# Patient Record
Sex: MALE | Race: WHITE | NOT HISPANIC OR LATINO | Employment: OTHER | URBAN - METROPOLITAN AREA
[De-identification: names, ages, dates, MRNs, and addresses within clinical notes are randomized per-mention and may not be internally consistent; named-entity substitution may affect disease eponyms.]

---

## 2020-11-21 ENCOUNTER — HOSPITAL ENCOUNTER (EMERGENCY)
Facility: HOSPITAL | Age: 35
Discharge: HOME | End: 2020-11-21
Attending: INTERNAL MEDICINE
Payer: MEDICAID

## 2020-11-21 VITALS
TEMPERATURE: 98 F | OXYGEN SATURATION: 98 % | WEIGHT: 265 LBS | DIASTOLIC BLOOD PRESSURE: 98 MMHG | HEIGHT: 75 IN | SYSTOLIC BLOOD PRESSURE: 137 MMHG | BODY MASS INDEX: 32.95 KG/M2 | RESPIRATION RATE: 20 BRPM | HEART RATE: 98 BPM

## 2020-11-21 DIAGNOSIS — R52 PAIN: ICD-10-CM

## 2020-11-21 DIAGNOSIS — M25.521 RIGHT ELBOW PAIN: Primary | ICD-10-CM

## 2020-11-21 PROCEDURE — 73080 X-RAY EXAM OF ELBOW: CPT | Mod: TC,FY,RT

## 2020-11-21 PROCEDURE — 99284 EMERGENCY DEPT VISIT MOD MDM: CPT | Mod: 25

## 2020-11-21 PROCEDURE — 63600175 PHARM REV CODE 636 W HCPCS: Performed by: INTERNAL MEDICINE

## 2020-11-21 PROCEDURE — 73080 X-RAY EXAM OF ELBOW: CPT | Mod: 26,RT,, | Performed by: RADIOLOGY

## 2020-11-21 PROCEDURE — 96372 THER/PROPH/DIAG INJ SC/IM: CPT

## 2020-11-21 PROCEDURE — 73080 XR ELBOW COMPLETE 3 VIEW RIGHT: ICD-10-PCS | Mod: 26,RT,, | Performed by: RADIOLOGY

## 2020-11-21 RX ORDER — ORPHENADRINE CITRATE 100 MG/1
100 TABLET, EXTENDED RELEASE ORAL 2 TIMES DAILY PRN
Qty: 10 TABLET | Refills: 0 | Status: SHIPPED | OUTPATIENT
Start: 2020-11-21 | End: 2020-12-01

## 2020-11-21 RX ORDER — KETOROLAC TROMETHAMINE 30 MG/ML
60 INJECTION, SOLUTION INTRAMUSCULAR; INTRAVENOUS
Status: COMPLETED | OUTPATIENT
Start: 2020-11-21 | End: 2020-11-21

## 2020-11-21 RX ADMIN — KETOROLAC TROMETHAMINE 60 MG: 30 INJECTION, SOLUTION INTRAMUSCULAR at 09:11
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
## 2020-11-30 ENCOUNTER — TELEPHONE (OUTPATIENT)
Dept: ORTHOPEDICS | Facility: CLINIC | Age: 35
End: 2020-11-30

## 2020-11-30 NOTE — TELEPHONE ENCOUNTER
Received referral for right elbow pain. Patient has [place] Medicaid. Advised patient to call his insurance to see who he can see. Verbalized understanding.

## 2020-12-01 ENCOUNTER — OFFICE VISIT (OUTPATIENT)
Dept: FAMILY MEDICINE | Facility: CLINIC | Age: 35
End: 2020-12-01
Payer: MEDICAID

## 2020-12-01 VITALS
HEART RATE: 83 BPM | WEIGHT: 257 LBS | HEIGHT: 75 IN | OXYGEN SATURATION: 97 % | SYSTOLIC BLOOD PRESSURE: 130 MMHG | TEMPERATURE: 98 F | BODY MASS INDEX: 31.95 KG/M2 | DIASTOLIC BLOOD PRESSURE: 82 MMHG

## 2020-12-01 DIAGNOSIS — M77.01 MEDIAL EPICONDYLITIS OF RIGHT ELBOW: ICD-10-CM

## 2020-12-01 DIAGNOSIS — M79.605 PAIN IN LEFT LEG: ICD-10-CM

## 2020-12-01 DIAGNOSIS — R41.840 DECREASED ATTENTION SPAN: Primary | ICD-10-CM

## 2020-12-01 DIAGNOSIS — F41.1 GENERALIZED ANXIETY DISORDER: ICD-10-CM

## 2020-12-01 DIAGNOSIS — M77.11 LATERAL EPICONDYLITIS OF RIGHT ELBOW: ICD-10-CM

## 2020-12-01 DIAGNOSIS — Z00.00 PREVENTATIVE HEALTH CARE: ICD-10-CM

## 2020-12-01 DIAGNOSIS — R20.2 PARESTHESIA OF LEFT LEG: ICD-10-CM

## 2020-12-01 DIAGNOSIS — Z11.59 SCREENING FOR VIRAL DISEASE: ICD-10-CM

## 2020-12-01 PROCEDURE — 99204 OFFICE O/P NEW MOD 45 MIN: CPT | Mod: S$PBB,,, | Performed by: NURSE PRACTITIONER

## 2020-12-01 PROCEDURE — 99205 OFFICE O/P NEW HI 60 MIN: CPT | Performed by: NURSE PRACTITIONER

## 2020-12-01 PROCEDURE — 99204 PR OFFICE/OUTPT VISIT, NEW, LEVL IV, 45-59 MIN: ICD-10-PCS | Mod: S$PBB,,, | Performed by: NURSE PRACTITIONER

## 2020-12-01 RX ORDER — BUSPIRONE HYDROCHLORIDE 7.5 MG/1
7.5 TABLET ORAL 2 TIMES DAILY
Qty: 60 TABLET | Refills: 3 | Status: SHIPPED | OUTPATIENT
Start: 2020-12-01

## 2020-12-01 RX ORDER — IBUPROFEN 800 MG/1
800 TABLET ORAL
Qty: 60 TABLET | Refills: 0 | Status: ON HOLD | OUTPATIENT
Start: 2020-12-01 | End: 2021-02-12 | Stop reason: HOSPADM

## 2020-12-01 RX ORDER — GABAPENTIN 100 MG/1
CAPSULE ORAL
Qty: 60 CAPSULE | Refills: 2 | Status: SHIPPED | OUTPATIENT
Start: 2020-12-01
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
## 2020-12-01 NOTE — PATIENT INSTRUCTIONS
Anxiety Reaction  Anxiety is the feeling we all get when we think something bad might happen. It is a normal response to stress and usually causes only a mild reaction. When anxiety becomes more severe, it can interfere with daily life. In some cases, you may not even be aware of what it is youre anxious about. There may also be a genetic link or it may be a learned behavior in the home.  Both psychological and physical triggers cause stress reaction. It's often a response to fear or emotional stress, real or imagined. This stress may come from home, family, work, or social relationships.  During an anxiety reaction, you may feel:  · Helpless  · Nervous  · Depressed  · Irritable  Your body may show signs of anxiety in many ways. You may experience:  · Dry mouth  · Shakiness  · Dizziness  · Weakness  · Trouble breathing  · Breathing fast (hyperventilating)  · Chest pressure  · Sweating  · Headache  · Nausea  · Diarrhea  · Tiredness  · Inability to sleep  · Sexual problems  Home care  · Try to locate the sources of stress in your life. They may not be obvious. These may include:  ¨ Daily hassles of life (traffic jams, missed appointments, car troubles, etc.)  ¨ Major life changes, both good (new baby, job promotion) and bad (loss of job, loss of loved one)  ¨ Overload: feeling that you have too many responsibilities and can't take care of all of them at once  ¨ Feeling helpless, feeling that your problems are beyond what youre able to solve  · Notice how your body reacts to stress. Learn to listen to your body signals. This will help you take action before the stress becomes severe.  · When you can, do something about the source of your stress. (Avoid hassles, limit the amount of change that happens in your life at one time and take a break when you feel overloaded).  · Unfortunately, many stressful situations can't be avoided. It is necessary to learn how to better manage stress. There are many proven methods  that will reduce your anxiety. These include simple things like exercise, good nutrition and adequate rest. Also, there are certain techniques that are helpful:  ¨ Relaxation  ¨ Breathing exercises  ¨ Visualization  ¨ Biofeedback  ¨ Meditation  For more information about this, consult your doctor or go to a local bookstore and review the many books and tapes available on this subject.  Follow-up care  If you feel that your anxiety is not responding to self-help measures, contact your doctor or make an appointment with a counselor. You may need short-term psychological counseling and temporary medicine to help you manage stress.  Call 911  Call your healthcare provider right away if any of these occur:  · Trouble breathing  · Confusion  · Drowsiness or trouble wakening  · Fainting or loss of consciousness  · Rapid heart rate  · Seizure  · New chest pain that becomes more severe, lasts longer, or spreads into your shoulder, arm, neck, jaw, or back  When to seek medical advice  Call your healthcare provider right away if any of these occur:  · Your symptoms get worse  · Severe headache not relieved by rest and mild pain reliever  Date Last Reviewed: 9/29/2015  © 7438-0608 [employer]. [address] [place] [address]. All rights reserved. This information is not intended as a substitute for professional medical care. Always follow your healthcare professional's instructions.        Understanding Medial Epicondylitis    Several muscles attach to the arm at the elbow joint. The tough bands of tissue that attach muscle to bones are called tendons. The bone in the upper arm has knobs on the farthest end called epicondyles. Tendons attach some arm muscles to these knobs. The tissues in this area can become irritated.  Epicondylitis is the medical term for a painful elbow over the epicondyle. Medial refers to the inner side of the elbow. Medial epicondylitis is sometimes called golfers  elbow.     How to say it  [id number]-tanya [id number]   Causes of medial epicondylitis  A painful inner elbow may be caused by:  · Using an elbow or hand the same way over and over  · Using poor form or too much force in a sport such as golf, tennis, or baseball  · Lifting too heavy a weight  · Other injuries to the arm or elbow  Symptoms of medial epicondylitis  · Pain or tenderness on the inside of the elbow that may travel down the forearm  · Pain when moving the wrist  · Pain or weakness when gripping something  · A crackling sound or grating feeling when moving the elbow  Treatment for medial epicondylitis  Treatments may include:  · Avoiding or changing the action that caused the problem. This helps prevent irritating the tissues more.  · Prescription or over-the-counter pain medicines. These help reduce inflammation, swelling, and pain.  · Cold or heat packs. These help reduce pain and swelling.  · Stretching and other exercises. These improve flexibility and strength.  · Physical therapy. This may include exercises or other treatments.  · Injections of medicine. This may relieve symptoms.  If other treatments do not relieve symptoms, you may need surgery.  Possible complications  If you dont give your elbow time to heal, symptoms may return or get worse. Follow your healthcare providers instructions on resting and treating your elbow.     When to call your healthcare provider  Call your healthcare provider right away if you have any of these:  · Fever of 100.4°F (38°C) or higher, or as directed  · Redness, swelling, or warmth that gets worse  · Symptoms that dont get better with prescribed medicines, or get worse  · New symptoms   Date Last Reviewed: 3/10/2016  © 8748-6239 [employer]. [address] [place] [id number]. All rights reserved. This information is not intended as a substitute for professional medical care. Always follow your healthcare professional's  instructions.        Understanding Lateral Epicondylitis    Tendons are strong bands of tissue that connect muscles to bones. Lateral epicondylitis affects the tendons that connect muscles in the forearm to the lateral epicondyle. This is the bony knob on the outer side of the elbow. The condition occurs if the extensor tendons of the wrist become red and swollen (irritated). This can cause pain in the elbow, forearm, and wrist. Because the condition is sometimes caused by playing tennis, it is also known as tennis elbow.  How to say it  LA-tuhr-tanya [id number]   What causes lateral epicondylitis?  The condition most often occurs because of overuse. This can be from any activity that repeatedly puts stress on the forearm extensor muscles or tendons and wrist. For instance, playing tennis, lifting weights, cutting meat, painting, and typing can all cause the condition. Wear and tear of the tendons from aging or an injury to the tendons can also cause the condition.  Symptoms of lateral epicondylitis  The most common symptom is pain. You may feel it on the outer side of the elbow and down the back of the forearm. It may be worse when moving or using the elbow, forearm, or wrist. You may also feel pain when gripping or lifting things.  Treatment for lateral epicondylitis  Treatments may include:  · Resting the elbow, forearm, and wrist. Youll need to avoid movements that can make your symptoms worse. You also may need to avoid certain sports and types of work for a time. This helps relieve symptoms and prevent further damage to the tendons.  · Changing the action that caused the problem. For instance, if the tendons were damaged from playing tennis, it may help to change your playing technique or use different equipment. This helps prevent further damage to the tendons.  · Using cold packs. Putting an ice pack on the injured area can help reduce pain and swelling.  · Taking pain medicines. Taking  prescription or over-the-counter pain medicines may help reduce pain and swelling.    · Wearing a brace. This helps reduce strain on the muscles and tendons in the forearm, which may relieve symptoms. It is very important to wear the brace properly.  · Doing exercises and physical therapy. These help improve strength and range of motion in the elbow, forearm, and wrist.  · Getting shots of medicine into the injured area. These may help relieve symptoms for a time.  · Having surgery. This may be an option if other treatments fail to relieve symptoms. In many cases, the surgeon removes the damaged tissue.  Possible complications of lateral epicondylitis  If the tendons involved dont heal properly, symptoms may return or get worse. To help prevent this, follow your treatment plan as directed.  When to call your healthcare provider  Call your healthcare provider right away if you have any of these:  · Fever of 100.4°F (38°C) or higher, or as directed  · Redness, swelling, or warmth in the elbow or forearm that gets worse  · Symptoms that dont get better with treatment, or get worse  · New symptoms   Date Last Reviewed: 3/10/2016  © 5718-5324 [employer]. [address] [place] [id number]. All rights reserved. This information is not intended as a substitute for professional medical care. Always follow your healthcare professional's instructions.

## 2021-02-08 ENCOUNTER — HOSPITAL ENCOUNTER (EMERGENCY)
Facility: HOSPITAL | Age: 36
Discharge: HOME | End: 2021-02-08
Attending: EMERGENCY MEDICINE
Payer: MEDICAID

## 2021-02-08 VITALS
DIASTOLIC BLOOD PRESSURE: 73 MMHG | HEART RATE: 72 BPM | WEIGHT: 260 LBS | SYSTOLIC BLOOD PRESSURE: 125 MMHG | OXYGEN SATURATION: 98 % | HEIGHT: 74 IN | BODY MASS INDEX: 33.37 KG/M2 | TEMPERATURE: 98 F | RESPIRATION RATE: 21 BRPM

## 2021-02-08 DIAGNOSIS — S39.011A STRAIN OF ABDOMINAL WALL, INITIAL ENCOUNTER: Primary | ICD-10-CM

## 2021-02-08 LAB
ALBUMIN SERPL BCP-MCNC: 3.9 G/DL (ref 3.5–5.2)
ALP SERPL-CCNC: 45 U/L (ref 55–135)
ALT SERPL W/O P-5'-P-CCNC: 41 U/L (ref 10–44)
ANION GAP SERPL CALC-SCNC: 7 MMOL/L (ref 8–16)
AST SERPL-CCNC: 29 U/L (ref 10–40)
BASOPHILS # BLD AUTO: 0.05 K/UL (ref 0–0.2)
BASOPHILS NFR BLD: 0.6 % (ref 0–1.9)
BILIRUB SERPL-MCNC: 0.6 MG/DL (ref 0.1–1)
BILIRUB UR QL STRIP: NEGATIVE
BUN SERPL-MCNC: 19 MG/DL (ref 6–20)
CALCIUM SERPL-MCNC: 9.1 MG/DL (ref 8.7–10.5)
CHLORIDE SERPL-SCNC: 108 MMOL/L (ref 95–110)
CLARITY UR: CLEAR
CO2 SERPL-SCNC: 26 MMOL/L (ref 23–29)
COLOR UR: YELLOW
CREAT SERPL-MCNC: 0.9 MG/DL (ref 0.5–1.4)
DIFFERENTIAL METHOD: ABNORMAL
EOSINOPHIL # BLD AUTO: 0.2 K/UL (ref 0–0.5)
EOSINOPHIL NFR BLD: 1.9 % (ref 0–8)
ERYTHROCYTE [DISTWIDTH] IN BLOOD BY AUTOMATED COUNT: 12.8 % (ref 11.5–14.5)
EST. GFR  (AFRICAN AMERICAN): >60 ML/MIN/1.73 M^2
EST. GFR  (NON AFRICAN AMERICAN): >60 ML/MIN/1.73 M^2
GLUCOSE SERPL-MCNC: 99 MG/DL (ref 70–110)
GLUCOSE UR QL STRIP: NEGATIVE
HCT VFR BLD AUTO: 41.2 % (ref 40–54)
HGB BLD-MCNC: 13.7 G/DL (ref 14–18)
HGB UR QL STRIP: NEGATIVE
IMM GRANULOCYTES # BLD AUTO: 0.01 K/UL (ref 0–0.04)
IMM GRANULOCYTES NFR BLD AUTO: 0.1 % (ref 0–0.5)
KETONES UR QL STRIP: NEGATIVE
LEUKOCYTE ESTERASE UR QL STRIP: NEGATIVE
LYMPHOCYTES # BLD AUTO: 2.7 K/UL (ref 1–4.8)
LYMPHOCYTES NFR BLD: 33.1 % (ref 18–48)
MCH RBC QN AUTO: 31.9 PG (ref 27–31)
MCHC RBC AUTO-ENTMCNC: 33.3 G/DL (ref 32–36)
MCV RBC AUTO: 96 FL (ref 82–98)
MONOCYTES # BLD AUTO: 0.6 K/UL (ref 0.3–1)
MONOCYTES NFR BLD: 7.5 % (ref 4–15)
NEUTROPHILS # BLD AUTO: 4.7 K/UL (ref 1.8–7.7)
NEUTROPHILS NFR BLD: 56.8 % (ref 38–73)
NITRITE UR QL STRIP: NEGATIVE
NRBC BLD-RTO: 0 /100 WBC
PH UR STRIP: 7 [PH] (ref 5–8)
PLATELET # BLD AUTO: 274 K/UL (ref 150–350)
PMV BLD AUTO: 10.1 FL (ref 9.2–12.9)
POTASSIUM SERPL-SCNC: 3.9 MMOL/L (ref 3.5–5.1)
PROT SERPL-MCNC: 6.7 G/DL (ref 6–8.4)
PROT UR QL STRIP: NEGATIVE
RBC # BLD AUTO: 4.29 M/UL (ref 4.6–6.2)
SODIUM SERPL-SCNC: 141 MMOL/L (ref 136–145)
SP GR UR STRIP: >=1.03 (ref 1–1.03)
URN SPEC COLLECT METH UR: ABNORMAL
UROBILINOGEN UR STRIP-ACNC: NEGATIVE EU/DL
WBC # BLD AUTO: 8.28 K/UL (ref 3.9–12.7)

## 2021-02-08 PROCEDURE — 63600175 PHARM REV CODE 636 W HCPCS: Performed by: PHYSICIAN ASSISTANT

## 2021-02-08 PROCEDURE — 74177 CT ABDOMEN PELVIS WITH CONTRAST: ICD-10-PCS | Mod: 26,,, | Performed by: RADIOLOGY

## 2021-02-08 PROCEDURE — 81003 URINALYSIS AUTO W/O SCOPE: CPT

## 2021-02-08 PROCEDURE — 74177 CT ABD & PELVIS W/CONTRAST: CPT | Mod: 26,,, | Performed by: RADIOLOGY

## 2021-02-08 PROCEDURE — 74177 CT ABD & PELVIS W/CONTRAST: CPT | Mod: TC

## 2021-02-08 PROCEDURE — 80053 COMPREHEN METABOLIC PANEL: CPT

## 2021-02-08 PROCEDURE — 25500020 PHARM REV CODE 255: Performed by: EMERGENCY MEDICINE

## 2021-02-08 PROCEDURE — 96374 THER/PROPH/DIAG INJ IV PUSH: CPT | Mod: 59

## 2021-02-08 PROCEDURE — 85025 COMPLETE CBC W/AUTO DIFF WBC: CPT

## 2021-02-08 PROCEDURE — 99285 EMERGENCY DEPT VISIT HI MDM: CPT | Mod: 25

## 2021-02-08 RX ORDER — KETOROLAC TROMETHAMINE 30 MG/ML
15 INJECTION, SOLUTION INTRAMUSCULAR; INTRAVENOUS
Status: COMPLETED | OUTPATIENT
Start: 2021-02-08 | End: 2021-02-08

## 2021-02-08 RX ADMIN — IOHEXOL 100 ML: 350 INJECTION, SOLUTION INTRAVENOUS at 12:02

## 2021-02-08 RX ADMIN — KETOROLAC TROMETHAMINE 15 MG: 30 INJECTION, SOLUTION INTRAMUSCULAR at 12:02

## 2021-02-11 ENCOUNTER — OFFICE VISIT (OUTPATIENT)
Dept: URGENT CARE | Facility: CLINIC | Age: 36
End: 2021-02-11
Payer: MEDICAID

## 2021-02-11 ENCOUNTER — HOSPITAL ENCOUNTER (OUTPATIENT)
Facility: HOSPITAL | Age: 36
Discharge: HOME | End: 2021-02-12
Attending: EMERGENCY MEDICINE | Admitting: STUDENT IN AN ORGANIZED HEALTH CARE EDUCATION/TRAINING PROGRAM
Payer: MEDICAID

## 2021-02-11 VITALS
BODY MASS INDEX: 31.83 KG/M2 | HEART RATE: 103 BPM | SYSTOLIC BLOOD PRESSURE: 157 MMHG | WEIGHT: 256 LBS | DIASTOLIC BLOOD PRESSURE: 92 MMHG | RESPIRATION RATE: 15 BRPM | HEIGHT: 75 IN | TEMPERATURE: 98 F | OXYGEN SATURATION: 99 %

## 2021-02-11 DIAGNOSIS — K92.2 UPPER GI BLEED: Primary | ICD-10-CM

## 2021-02-11 DIAGNOSIS — K92.0 HEMATEMESIS, PRESENCE OF NAUSEA NOT SPECIFIED: Primary | ICD-10-CM

## 2021-02-11 PROBLEM — F41.9 ANXIETY: Status: ACTIVE | Noted: 2021-02-11

## 2021-02-11 PROBLEM — Z72.0 TOBACCO ABUSE: Status: ACTIVE | Noted: 2021-02-11

## 2021-02-11 PROBLEM — G62.9 NEUROPATHY: Status: ACTIVE | Noted: 2021-02-11

## 2021-02-11 PROBLEM — R79.89 ELEVATED LFTS: Status: ACTIVE | Noted: 2021-02-11

## 2021-02-11 LAB
ABO + RH BLD: NORMAL
ALBUMIN SERPL BCP-MCNC: 4.4 G/DL (ref 3.5–5.2)
ALBUMIN SERPL BCP-MCNC: 4.7 G/DL (ref 3.5–5.2)
ALP SERPL-CCNC: 47 U/L (ref 55–135)
ALP SERPL-CCNC: 51 U/L (ref 55–135)
ALT SERPL W/O P-5'-P-CCNC: 61 U/L (ref 10–44)
ALT SERPL W/O P-5'-P-CCNC: 65 U/L (ref 10–44)
ANION GAP SERPL CALC-SCNC: 11 MMOL/L (ref 8–16)
ANION GAP SERPL CALC-SCNC: 12 MMOL/L (ref 8–16)
APTT BLDCRRT: 25.5 SEC (ref 21–32)
AST SERPL-CCNC: 43 U/L (ref 10–40)
AST SERPL-CCNC: 44 U/L (ref 10–40)
BASOPHILS # BLD AUTO: 0.04 K/UL (ref 0–0.2)
BASOPHILS NFR BLD: 0.3 % (ref 0–1.9)
BILIRUB SERPL-MCNC: 0.8 MG/DL (ref 0.1–1)
BILIRUB SERPL-MCNC: 0.9 MG/DL (ref 0.1–1)
BLD GP AB SCN CELLS X3 SERPL QL: NORMAL
BUN SERPL-MCNC: 16 MG/DL (ref 6–20)
BUN SERPL-MCNC: 16 MG/DL (ref 6–20)
CALCIUM SERPL-MCNC: 8.9 MG/DL (ref 8.7–10.5)
CALCIUM SERPL-MCNC: 9.4 MG/DL (ref 8.7–10.5)
CHLORIDE SERPL-SCNC: 104 MMOL/L (ref 95–110)
CHLORIDE SERPL-SCNC: 106 MMOL/L (ref 95–110)
CO2 SERPL-SCNC: 23 MMOL/L (ref 23–29)
CO2 SERPL-SCNC: 25 MMOL/L (ref 23–29)
CREAT SERPL-MCNC: 0.9 MG/DL (ref 0.5–1.4)
CREAT SERPL-MCNC: 1 MG/DL (ref 0.5–1.4)
CTP QC/QA: YES
DIFFERENTIAL METHOD: ABNORMAL
EOSINOPHIL # BLD AUTO: 0 K/UL (ref 0–0.5)
EOSINOPHIL NFR BLD: 0.2 % (ref 0–8)
ERYTHROCYTE [DISTWIDTH] IN BLOOD BY AUTOMATED COUNT: 12.8 % (ref 11.5–14.5)
EST. GFR  (AFRICAN AMERICAN): >60 ML/MIN/1.73 M^2
EST. GFR  (AFRICAN AMERICAN): >60 ML/MIN/1.73 M^2
EST. GFR  (NON AFRICAN AMERICAN): >60 ML/MIN/1.73 M^2
EST. GFR  (NON AFRICAN AMERICAN): >60 ML/MIN/1.73 M^2
GLUCOSE SERPL-MCNC: 84 MG/DL (ref 70–110)
GLUCOSE SERPL-MCNC: 85 MG/DL (ref 70–110)
HCT VFR BLD AUTO: 45 % (ref 40–54)
HGB BLD-MCNC: 15 G/DL (ref 14–18)
IMM GRANULOCYTES # BLD AUTO: 0.05 K/UL (ref 0–0.04)
IMM GRANULOCYTES NFR BLD AUTO: 0.4 % (ref 0–0.5)
INR PPP: 1 (ref 0.8–1.2)
LIPASE SERPL-CCNC: 24 U/L (ref 4–60)
LYMPHOCYTES # BLD AUTO: 2.6 K/UL (ref 1–4.8)
LYMPHOCYTES NFR BLD: 21.1 % (ref 18–48)
MCH RBC QN AUTO: 32 PG (ref 27–31)
MCHC RBC AUTO-ENTMCNC: 33.3 G/DL (ref 32–36)
MCV RBC AUTO: 96 FL (ref 82–98)
MONOCYTES # BLD AUTO: 0.7 K/UL (ref 0.3–1)
MONOCYTES NFR BLD: 5.8 % (ref 4–15)
NEUTROPHILS # BLD AUTO: 8.7 K/UL (ref 1.8–7.7)
NEUTROPHILS NFR BLD: 72.2 % (ref 38–73)
NRBC BLD-RTO: 0 /100 WBC
PLATELET # BLD AUTO: 287 K/UL (ref 150–350)
PMV BLD AUTO: 9.9 FL (ref 9.2–12.9)
POTASSIUM SERPL-SCNC: 3.5 MMOL/L (ref 3.5–5.1)
POTASSIUM SERPL-SCNC: 3.6 MMOL/L (ref 3.5–5.1)
PROT SERPL-MCNC: 7.2 G/DL (ref 6–8.4)
PROT SERPL-MCNC: 7.9 G/DL (ref 6–8.4)
PROTHROMBIN TIME: 10.6 SEC (ref 9–12.5)
RBC # BLD AUTO: 4.69 M/UL (ref 4.6–6.2)
SARS-COV-2 RDRP RESP QL NAA+PROBE: NEGATIVE
SODIUM SERPL-SCNC: 140 MMOL/L (ref 136–145)
SODIUM SERPL-SCNC: 141 MMOL/L (ref 136–145)
WBC # BLD AUTO: 12.09 K/UL (ref 3.9–12.7)

## 2021-02-11 PROCEDURE — G0378 HOSPITAL OBSERVATION PER HR: HCPCS

## 2021-02-11 PROCEDURE — 80053 COMPREHEN METABOLIC PANEL: CPT

## 2021-02-11 PROCEDURE — 99285 EMERGENCY DEPT VISIT HI MDM: CPT | Mod: 25

## 2021-02-11 PROCEDURE — 36415 COLL VENOUS BLD VENIPUNCTURE: CPT

## 2021-02-11 PROCEDURE — 80053 COMPREHEN METABOLIC PANEL: CPT | Mod: 91

## 2021-02-11 PROCEDURE — 96375 TX/PRO/DX INJ NEW DRUG ADDON: CPT

## 2021-02-11 PROCEDURE — 85025 COMPLETE CBC W/AUTO DIFF WBC: CPT

## 2021-02-11 PROCEDURE — U0002 COVID-19 LAB TEST NON-CDC: HCPCS | Performed by: EMERGENCY MEDICINE

## 2021-02-11 PROCEDURE — 85730 THROMBOPLASTIN TIME PARTIAL: CPT

## 2021-02-11 PROCEDURE — 63600175 PHARM REV CODE 636 W HCPCS: Performed by: EMERGENCY MEDICINE

## 2021-02-11 PROCEDURE — 80074 ACUTE HEPATITIS PANEL: CPT

## 2021-02-11 PROCEDURE — C9113 INJ PANTOPRAZOLE SODIUM, VIA: HCPCS | Performed by: EMERGENCY MEDICINE

## 2021-02-11 PROCEDURE — 25000003 PHARM REV CODE 250: Performed by: NURSE PRACTITIONER

## 2021-02-11 PROCEDURE — 96374 THER/PROPH/DIAG INJ IV PUSH: CPT

## 2021-02-11 PROCEDURE — 83690 ASSAY OF LIPASE: CPT

## 2021-02-11 PROCEDURE — 96361 HYDRATE IV INFUSION ADD-ON: CPT

## 2021-02-11 PROCEDURE — 25000003 PHARM REV CODE 250: Performed by: EMERGENCY MEDICINE

## 2021-02-11 PROCEDURE — 86900 BLOOD TYPING SEROLOGIC ABO: CPT

## 2021-02-11 PROCEDURE — 99203 OFFICE O/P NEW LOW 30 MIN: CPT | Mod: S$GLB,,, | Performed by: NURSE PRACTITIONER

## 2021-02-11 PROCEDURE — 85610 PROTHROMBIN TIME: CPT

## 2021-02-11 PROCEDURE — 99203 PR OFFICE/OUTPT VISIT, NEW, LEVL III, 30-44 MIN: ICD-10-PCS | Mod: S$GLB,,, | Performed by: NURSE PRACTITIONER

## 2021-02-11 RX ORDER — IBUPROFEN 200 MG
1 TABLET ORAL DAILY
Status: DISCONTINUED | OUTPATIENT
Start: 2021-02-12 | End: 2021-02-12 | Stop reason: HOSPADM

## 2021-02-11 RX ORDER — ACETAMINOPHEN 325 MG/1
650 TABLET ORAL EVERY 8 HOURS PRN
Status: DISCONTINUED | OUTPATIENT
Start: 2021-02-11 | End: 2021-02-12 | Stop reason: HOSPADM

## 2021-02-11 RX ORDER — OXYCODONE HYDROCHLORIDE 5 MG/1
5 TABLET ORAL EVERY 6 HOURS PRN
Status: DISCONTINUED | OUTPATIENT
Start: 2021-02-11 | End: 2021-02-12 | Stop reason: HOSPADM

## 2021-02-11 RX ORDER — MORPHINE SULFATE 2 MG/ML
2 INJECTION, SOLUTION INTRAMUSCULAR; INTRAVENOUS EVERY 6 HOURS PRN
Status: DISCONTINUED | OUTPATIENT
Start: 2021-02-11 | End: 2021-02-12 | Stop reason: HOSPADM

## 2021-02-11 RX ORDER — PANTOPRAZOLE SODIUM 40 MG/10ML
40 INJECTION, POWDER, LYOPHILIZED, FOR SOLUTION INTRAVENOUS
Status: COMPLETED | OUTPATIENT
Start: 2021-02-11 | End: 2021-02-11

## 2021-02-11 RX ORDER — OXYCODONE HYDROCHLORIDE 10 MG/1
10 TABLET ORAL
Status: COMPLETED | OUTPATIENT
Start: 2021-02-11 | End: 2021-02-11

## 2021-02-11 RX ORDER — SODIUM CHLORIDE 9 MG/ML
INJECTION, SOLUTION INTRAVENOUS CONTINUOUS
Status: DISCONTINUED | OUTPATIENT
Start: 2021-02-11 | End: 2021-02-12 | Stop reason: HOSPADM

## 2021-02-11 RX ORDER — PANTOPRAZOLE SODIUM 40 MG/10ML
40 INJECTION, POWDER, LYOPHILIZED, FOR SOLUTION INTRAVENOUS 2 TIMES DAILY
Status: DISCONTINUED | OUTPATIENT
Start: 2021-02-12 | End: 2021-02-12 | Stop reason: HOSPADM

## 2021-02-11 RX ORDER — GABAPENTIN 100 MG/1
200 CAPSULE ORAL NIGHTLY
Status: DISCONTINUED | OUTPATIENT
Start: 2021-02-11 | End: 2021-02-12 | Stop reason: HOSPADM

## 2021-02-11 RX ORDER — BUSPIRONE HYDROCHLORIDE 7.5 MG/1
7.5 TABLET ORAL 2 TIMES DAILY
Status: DISCONTINUED | OUTPATIENT
Start: 2021-02-11 | End: 2021-02-11

## 2021-02-11 RX ORDER — ONDANSETRON 2 MG/ML
8 INJECTION INTRAMUSCULAR; INTRAVENOUS EVERY 8 HOURS PRN
Status: DISCONTINUED | OUTPATIENT
Start: 2021-02-12 | End: 2021-02-12 | Stop reason: HOSPADM

## 2021-02-11 RX ORDER — ONDANSETRON 2 MG/ML
8 INJECTION INTRAMUSCULAR; INTRAVENOUS
Status: COMPLETED | OUTPATIENT
Start: 2021-02-11 | End: 2021-02-11

## 2021-02-11 RX ORDER — SUCRALFATE 1 G/10ML
1 SUSPENSION ORAL ONCE
Status: COMPLETED | OUTPATIENT
Start: 2021-02-11 | End: 2021-02-11

## 2021-02-11 RX ORDER — MORPHINE SULFATE 10 MG/ML
10 INJECTION INTRAMUSCULAR; INTRAVENOUS; SUBCUTANEOUS
Status: COMPLETED | OUTPATIENT
Start: 2021-02-11 | End: 2021-02-11

## 2021-02-11 RX ORDER — PANTOPRAZOLE SODIUM 40 MG/10ML
40 INJECTION, POWDER, LYOPHILIZED, FOR SOLUTION INTRAVENOUS 2 TIMES DAILY
Status: CANCELLED | OUTPATIENT
Start: 2021-02-11

## 2021-02-11 RX ADMIN — SUCRALFATE 1 G: 1 SUSPENSION ORAL at 09:02

## 2021-02-11 RX ADMIN — SODIUM CHLORIDE: 0.9 INJECTION, SOLUTION INTRAVENOUS at 11:02

## 2021-02-11 RX ADMIN — GABAPENTIN 200 MG: 100 CAPSULE ORAL at 11:02

## 2021-02-11 RX ADMIN — OXYCODONE HYDROCHLORIDE 10 MG: 10 TABLET ORAL at 08:02

## 2021-02-11 RX ADMIN — PANTOPRAZOLE SODIUM 40 MG: 40 INJECTION, POWDER, FOR SOLUTION INTRAVENOUS at 08:02

## 2021-02-11 RX ADMIN — ONDANSETRON 8 MG: 2 INJECTION INTRAMUSCULAR; INTRAVENOUS at 08:02

## 2021-02-11 RX ADMIN — MORPHINE SULFATE 10 MG: 10 INJECTION, SOLUTION INTRAMUSCULAR; INTRAVENOUS at 09:02

## 2021-02-12 ENCOUNTER — ANESTHESIA EVENT (OUTPATIENT)
Dept: ENDOSCOPY | Facility: HOSPITAL | Age: 36
End: 2021-02-12
Payer: MEDICAID

## 2021-02-12 ENCOUNTER — ANESTHESIA (OUTPATIENT)
Dept: ENDOSCOPY | Facility: HOSPITAL | Age: 36
End: 2021-02-12
Payer: MEDICAID

## 2021-02-12 VITALS
HEIGHT: 75 IN | WEIGHT: 265 LBS | TEMPERATURE: 98 F | DIASTOLIC BLOOD PRESSURE: 60 MMHG | HEART RATE: 60 BPM | SYSTOLIC BLOOD PRESSURE: 136 MMHG | OXYGEN SATURATION: 98 % | RESPIRATION RATE: 16 BRPM | BODY MASS INDEX: 32.95 KG/M2

## 2021-02-12 LAB
BASOPHILS # BLD AUTO: 0.05 K/UL (ref 0–0.2)
BASOPHILS NFR BLD: 0.5 % (ref 0–1.9)
BASOPHILS NFR BLD: 0.5 % (ref 0–1.9)
BASOPHILS NFR BLD: 0.6 % (ref 0–1.9)
DIFFERENTIAL METHOD: ABNORMAL
EOSINOPHIL # BLD AUTO: 0.1 K/UL (ref 0–0.5)
EOSINOPHIL NFR BLD: 1.4 % (ref 0–8)
EOSINOPHIL NFR BLD: 1.5 % (ref 0–8)
EOSINOPHIL NFR BLD: 1.5 % (ref 0–8)
ERYTHROCYTE [DISTWIDTH] IN BLOOD BY AUTOMATED COUNT: 12.7 % (ref 11.5–14.5)
ERYTHROCYTE [DISTWIDTH] IN BLOOD BY AUTOMATED COUNT: 12.8 % (ref 11.5–14.5)
ERYTHROCYTE [DISTWIDTH] IN BLOOD BY AUTOMATED COUNT: 13 % (ref 11.5–14.5)
HAV IGM SERPL QL IA: NEGATIVE
HBV CORE IGM SERPL QL IA: NEGATIVE
HBV SURFACE AG SERPL QL IA: NEGATIVE
HCT VFR BLD AUTO: 41.5 % (ref 40–54)
HCT VFR BLD AUTO: 43.5 % (ref 40–54)
HCT VFR BLD AUTO: 44.5 % (ref 40–54)
HCV AB SERPL QL IA: POSITIVE
HGB BLD-MCNC: 13.8 G/DL (ref 14–18)
HGB BLD-MCNC: 14.2 G/DL (ref 14–18)
HGB BLD-MCNC: 14.4 G/DL (ref 14–18)
IMM GRANULOCYTES # BLD AUTO: 0.02 K/UL (ref 0–0.04)
IMM GRANULOCYTES NFR BLD AUTO: 0.2 % (ref 0–0.5)
LYMPHOCYTES # BLD AUTO: 2.8 K/UL (ref 1–4.8)
LYMPHOCYTES # BLD AUTO: 2.9 K/UL (ref 1–4.8)
LYMPHOCYTES # BLD AUTO: 3.2 K/UL (ref 1–4.8)
LYMPHOCYTES NFR BLD: 28.9 % (ref 18–48)
LYMPHOCYTES NFR BLD: 30.5 % (ref 18–48)
LYMPHOCYTES NFR BLD: 35.9 % (ref 18–48)
MAGNESIUM SERPL-MCNC: 2 MG/DL (ref 1.6–2.6)
MCH RBC QN AUTO: 31.1 PG (ref 27–31)
MCH RBC QN AUTO: 31.8 PG (ref 27–31)
MCH RBC QN AUTO: 32 PG (ref 27–31)
MCHC RBC AUTO-ENTMCNC: 32.4 G/DL (ref 32–36)
MCHC RBC AUTO-ENTMCNC: 32.6 G/DL (ref 32–36)
MCHC RBC AUTO-ENTMCNC: 33.3 G/DL (ref 32–36)
MCV RBC AUTO: 96 FL (ref 82–98)
MCV RBC AUTO: 96 FL (ref 82–98)
MCV RBC AUTO: 97 FL (ref 82–98)
MONOCYTES # BLD AUTO: 0.6 K/UL (ref 0.3–1)
MONOCYTES # BLD AUTO: 0.7 K/UL (ref 0.3–1)
MONOCYTES # BLD AUTO: 0.8 K/UL (ref 0.3–1)
MONOCYTES NFR BLD: 6.9 % (ref 4–15)
MONOCYTES NFR BLD: 7.1 % (ref 4–15)
MONOCYTES NFR BLD: 8.9 % (ref 4–15)
NEUTROPHILS # BLD AUTO: 4.7 K/UL (ref 1.8–7.7)
NEUTROPHILS # BLD AUTO: 5.6 K/UL (ref 1.8–7.7)
NEUTROPHILS # BLD AUTO: 5.9 K/UL (ref 1.8–7.7)
NEUTROPHILS NFR BLD: 52.9 % (ref 38–73)
NEUTROPHILS NFR BLD: 60.4 % (ref 38–73)
NEUTROPHILS NFR BLD: 61.9 % (ref 38–73)
NRBC BLD-RTO: 0 /100 WBC
PLATELET # BLD AUTO: 241 K/UL (ref 150–350)
PLATELET # BLD AUTO: 242 K/UL (ref 150–350)
PLATELET # BLD AUTO: 246 K/UL (ref 150–350)
PMV BLD AUTO: 10 FL (ref 9.2–12.9)
PMV BLD AUTO: 9.7 FL (ref 9.2–12.9)
PMV BLD AUTO: 9.7 FL (ref 9.2–12.9)
RBC # BLD AUTO: 4.31 M/UL (ref 4.6–6.2)
RBC # BLD AUTO: 4.47 M/UL (ref 4.6–6.2)
RBC # BLD AUTO: 4.63 M/UL (ref 4.6–6.2)
WBC # BLD AUTO: 8.91 K/UL (ref 3.9–12.7)
WBC # BLD AUTO: 9.33 K/UL (ref 3.9–12.7)
WBC # BLD AUTO: 9.54 K/UL (ref 3.9–12.7)

## 2021-02-12 PROCEDURE — 25000003 PHARM REV CODE 250: Performed by: NURSE ANESTHETIST, CERTIFIED REGISTERED

## 2021-02-12 PROCEDURE — D9220A PRA ANESTHESIA: Mod: ANES,,, | Performed by: ANESTHESIOLOGY

## 2021-02-12 PROCEDURE — 25000003 PHARM REV CODE 250: Performed by: NURSE PRACTITIONER

## 2021-02-12 PROCEDURE — G0378 HOSPITAL OBSERVATION PER HR: HCPCS

## 2021-02-12 PROCEDURE — 88342 CHG IMMUNOCYTOCHEMISTRY: ICD-10-PCS | Mod: 26,,, | Performed by: PATHOLOGY

## 2021-02-12 PROCEDURE — 88342 IMHCHEM/IMCYTCHM 1ST ANTB: CPT | Mod: 26,,, | Performed by: PATHOLOGY

## 2021-02-12 PROCEDURE — 43239 EGD BIOPSY SINGLE/MULTIPLE: CPT | Performed by: INTERNAL MEDICINE

## 2021-02-12 PROCEDURE — 37000008 HC ANESTHESIA 1ST 15 MINUTES: Performed by: INTERNAL MEDICINE

## 2021-02-12 PROCEDURE — 43239 PR EGD, FLEX, W/BIOPSY, SGL/MULTI: ICD-10-PCS | Mod: ,,, | Performed by: INTERNAL MEDICINE

## 2021-02-12 PROCEDURE — 87522 HEPATITIS C REVRS TRNSCRPJ: CPT

## 2021-02-12 PROCEDURE — 63600175 PHARM REV CODE 636 W HCPCS: Performed by: NURSE ANESTHETIST, CERTIFIED REGISTERED

## 2021-02-12 PROCEDURE — 43239 EGD BIOPSY SINGLE/MULTIPLE: CPT | Mod: ,,, | Performed by: INTERNAL MEDICINE

## 2021-02-12 PROCEDURE — 96376 TX/PRO/DX INJ SAME DRUG ADON: CPT | Mod: 59

## 2021-02-12 PROCEDURE — D9220A PRA ANESTHESIA: Mod: CRNA,,, | Performed by: NURSE ANESTHETIST, CERTIFIED REGISTERED

## 2021-02-12 PROCEDURE — 27201012 HC FORCEPS, HOT/COLD, DISP: Performed by: INTERNAL MEDICINE

## 2021-02-12 PROCEDURE — 88305 TISSUE EXAM BY PATHOLOGIST: CPT | Performed by: PATHOLOGY

## 2021-02-12 PROCEDURE — D9220A PRA ANESTHESIA: ICD-10-PCS | Mod: CRNA,,, | Performed by: NURSE ANESTHETIST, CERTIFIED REGISTERED

## 2021-02-12 PROCEDURE — 00731 ANES UPR GI NDSC PX NOS: CPT | Performed by: INTERNAL MEDICINE

## 2021-02-12 PROCEDURE — 99204 PR OFFICE/OUTPT VISIT, NEW, LEVL IV, 45-59 MIN: ICD-10-PCS | Mod: 25,,, | Performed by: INTERNAL MEDICINE

## 2021-02-12 PROCEDURE — 88305 TISSUE EXAM BY PATHOLOGIST: ICD-10-PCS | Mod: 26,,, | Performed by: PATHOLOGY

## 2021-02-12 PROCEDURE — 88342 IMHCHEM/IMCYTCHM 1ST ANTB: CPT | Performed by: PATHOLOGY

## 2021-02-12 PROCEDURE — 96361 HYDRATE IV INFUSION ADD-ON: CPT

## 2021-02-12 PROCEDURE — 36415 COLL VENOUS BLD VENIPUNCTURE: CPT

## 2021-02-12 PROCEDURE — C9113 INJ PANTOPRAZOLE SODIUM, VIA: HCPCS | Performed by: NURSE PRACTITIONER

## 2021-02-12 PROCEDURE — 85025 COMPLETE CBC W/AUTO DIFF WBC: CPT | Mod: 91

## 2021-02-12 PROCEDURE — 96375 TX/PRO/DX INJ NEW DRUG ADDON: CPT | Mod: 59

## 2021-02-12 PROCEDURE — 99204 OFFICE O/P NEW MOD 45 MIN: CPT | Mod: 25,,, | Performed by: INTERNAL MEDICINE

## 2021-02-12 PROCEDURE — 88305 TISSUE EXAM BY PATHOLOGIST: CPT | Mod: 26,,, | Performed by: PATHOLOGY

## 2021-02-12 PROCEDURE — 63600175 PHARM REV CODE 636 W HCPCS: Performed by: NURSE PRACTITIONER

## 2021-02-12 PROCEDURE — S4991 NICOTINE PATCH NONLEGEND: HCPCS | Performed by: NURSE PRACTITIONER

## 2021-02-12 PROCEDURE — D9220A PRA ANESTHESIA: ICD-10-PCS | Mod: ANES,,, | Performed by: ANESTHESIOLOGY

## 2021-02-12 PROCEDURE — 83735 ASSAY OF MAGNESIUM: CPT

## 2021-02-12 RX ORDER — PANTOPRAZOLE SODIUM 40 MG/1
40 TABLET, DELAYED RELEASE ORAL 2 TIMES DAILY
Status: DISCONTINUED | OUTPATIENT
Start: 2021-02-12 | End: 2021-02-12

## 2021-02-12 RX ORDER — HYOSCYAMINE SULFATE 0.12 MG/1
0.12 TABLET SUBLINGUAL ONCE
Status: COMPLETED | OUTPATIENT
Start: 2021-02-12 | End: 2021-02-12

## 2021-02-12 RX ORDER — LIDOCAINE HCL/PF 100 MG/5ML
SYRINGE (ML) INTRAVENOUS
Status: DISCONTINUED | OUTPATIENT
Start: 2021-02-12 | End: 2021-02-12

## 2021-02-12 RX ORDER — PANTOPRAZOLE SODIUM 40 MG/1
40 TABLET, DELAYED RELEASE ORAL 2 TIMES DAILY
Qty: 60 TABLET | Refills: 0 | Status: SHIPPED | OUTPATIENT
Start: 2021-02-12 | End: 2021-03-14

## 2021-02-12 RX ORDER — SODIUM CHLORIDE 9 MG/ML
INJECTION, SOLUTION INTRAVENOUS CONTINUOUS
Status: DISCONTINUED | OUTPATIENT
Start: 2021-02-12 | End: 2021-02-12 | Stop reason: HOSPADM

## 2021-02-12 RX ORDER — PROPOFOL 10 MG/ML
VIAL (ML) INTRAVENOUS
Status: DISCONTINUED | OUTPATIENT
Start: 2021-02-12 | End: 2021-02-12

## 2021-02-12 RX ADMIN — PROPOFOL 50 MG: 10 INJECTION, EMULSION INTRAVENOUS at 02:02

## 2021-02-12 RX ADMIN — OXYCODONE HYDROCHLORIDE 5 MG: 5 TABLET ORAL at 09:02

## 2021-02-12 RX ADMIN — HYOSCYAMINE SULFATE 0.12 MG: 0.12 TABLET ORAL; SUBLINGUAL at 12:02

## 2021-02-12 RX ADMIN — PANTOPRAZOLE SODIUM 40 MG: 40 INJECTION, POWDER, FOR SOLUTION INTRAVENOUS at 09:02

## 2021-02-12 RX ADMIN — Medication 1 PATCH: at 12:02

## 2021-02-12 RX ADMIN — LIDOCAINE HYDROCHLORIDE 100 MG: 20 INJECTION INTRAVENOUS at 02:02

## 2021-02-12 RX ADMIN — SODIUM CHLORIDE: 0.9 INJECTION, SOLUTION INTRAVENOUS at 09:02

## 2021-02-12 RX ADMIN — MORPHINE SULFATE 2 MG: 2 INJECTION, SOLUTION INTRAMUSCULAR; INTRAVENOUS at 04:02

## 2021-02-12 RX ADMIN — PROPOFOL 100 MG: 10 INJECTION, EMULSION INTRAVENOUS at 02:02

## 2021-02-15 ENCOUNTER — TELEPHONE (OUTPATIENT)
Dept: MEDSURG UNIT | Facility: HOSPITAL | Age: 36
End: 2021-02-15

## 2021-02-18 LAB
FINAL PATHOLOGIC DIAGNOSIS: NORMAL
GROSS: NORMAL
HCV RNA SERPL NAA+PROBE-LOG IU: 5.48 LOG (10) IU/ML
HCV RNA SERPL QL NAA+PROBE: DETECTED IU/ML
HCV RNA SPEC NAA+PROBE-ACNC: ABNORMAL IU/ML
Lab: NORMAL

## 2021-02-26 DIAGNOSIS — B18.2 CHRONIC HEPATITIS C WITHOUT HEPATIC COMA: Primary | ICD-10-CM

## 2021-03-01 ENCOUNTER — TELEPHONE (OUTPATIENT)
Dept: HEPATOLOGY | Facility: CLINIC | Age: 36
End: 2021-03-01

## 2021-03-01 ENCOUNTER — DOCUMENTATION ONLY (OUTPATIENT)
Dept: TRANSPLANT | Facility: CLINIC | Age: 36
End: 2021-03-01

## 2021-03-16 ENCOUNTER — TELEPHONE (OUTPATIENT)
Dept: GASTROENTEROLOGY | Facility: CLINIC | Age: 36
End: 2021-03-16

## 2021-05-10 ENCOUNTER — HOSPITAL ENCOUNTER (EMERGENCY)
Facility: HOSPITAL | Age: 36
Discharge: HOME | End: 2021-05-10
Attending: EMERGENCY MEDICINE
Payer: MEDICAID

## 2021-05-10 VITALS
HEART RATE: 92 BPM | DIASTOLIC BLOOD PRESSURE: 88 MMHG | SYSTOLIC BLOOD PRESSURE: 130 MMHG | BODY MASS INDEX: 30.54 KG/M2 | OXYGEN SATURATION: 99 % | WEIGHT: 238 LBS | TEMPERATURE: 98 F | RESPIRATION RATE: 18 BRPM | HEIGHT: 74 IN

## 2021-05-10 DIAGNOSIS — S93.601A FOOT SPRAIN, RIGHT, INITIAL ENCOUNTER: Primary | ICD-10-CM

## 2021-05-10 DIAGNOSIS — M79.671 FOOT PAIN, RIGHT: ICD-10-CM

## 2021-05-10 PROCEDURE — 99283 EMERGENCY DEPT VISIT LOW MDM: CPT | Mod: 25

## 2021-05-10 PROCEDURE — 29515 APPLICATION SHORT LEG SPLINT: CPT | Mod: RT

## 2021-05-10 RX ORDER — DICLOFENAC SODIUM 50 MG/1
50 TABLET, DELAYED RELEASE ORAL 3 TIMES DAILY
Qty: 15 TABLET | Refills: 0 | Status: SHIPPED | OUTPATIENT
Start: 2021-05-10 | End: 2022-05-10